# Patient Record
Sex: FEMALE | Race: OTHER | HISPANIC OR LATINO
[De-identification: names, ages, dates, MRNs, and addresses within clinical notes are randomized per-mention and may not be internally consistent; named-entity substitution may affect disease eponyms.]

---

## 2018-04-17 ENCOUNTER — RESULT REVIEW (OUTPATIENT)
Age: 68
End: 2018-04-17

## 2018-06-06 ENCOUNTER — RESULT REVIEW (OUTPATIENT)
Age: 68
End: 2018-06-06

## 2018-09-18 ENCOUNTER — RESULT REVIEW (OUTPATIENT)
Age: 68
End: 2018-09-18

## 2019-05-29 ENCOUNTER — RESULT REVIEW (OUTPATIENT)
Age: 69
End: 2019-05-29

## 2019-07-22 ENCOUNTER — APPOINTMENT (OUTPATIENT)
Dept: COLORECTAL SURGERY | Facility: CLINIC | Age: 69
End: 2019-07-22

## 2019-10-07 ENCOUNTER — APPOINTMENT (OUTPATIENT)
Dept: COLORECTAL SURGERY | Facility: CLINIC | Age: 69
End: 2019-10-07
Payer: MEDICARE

## 2019-10-07 VITALS
HEART RATE: 88 BPM | TEMPERATURE: 97.7 F | WEIGHT: 115 LBS | DIASTOLIC BLOOD PRESSURE: 83 MMHG | SYSTOLIC BLOOD PRESSURE: 131 MMHG

## 2019-10-07 DIAGNOSIS — Z82.5 FAMILY HISTORY OF ASTHMA AND OTHER CHRONIC LOWER RESPIRATORY DISEASES: ICD-10-CM

## 2019-10-07 DIAGNOSIS — Z12.11 ENCOUNTER FOR SCREENING FOR MALIGNANT NEOPLASM OF COLON: ICD-10-CM

## 2019-10-07 DIAGNOSIS — Z83.79 FAMILY HISTORY OF OTHER DISEASES OF THE DIGESTIVE SYSTEM: ICD-10-CM

## 2019-10-07 DIAGNOSIS — Z80.0 FAMILY HISTORY OF MALIGNANT NEOPLASM OF DIGESTIVE ORGANS: ICD-10-CM

## 2019-10-07 DIAGNOSIS — Z72.3 LACK OF PHYSICAL EXERCISE: ICD-10-CM

## 2019-10-07 DIAGNOSIS — E03.9 HYPOTHYROIDISM, UNSPECIFIED: ICD-10-CM

## 2019-10-07 PROCEDURE — 99213 OFFICE O/P EST LOW 20 MIN: CPT

## 2019-10-07 RX ORDER — LEVOTHYROXINE SODIUM 75 UG/1
75 TABLET ORAL
Refills: 0 | Status: ACTIVE | COMMUNITY

## 2019-10-10 NOTE — CONSULT LETTER
[Consult Letter:] : I had the pleasure of evaluating your patient, [unfilled]. [( Thank you for referring [unfilled] for consultation for _____ )] : Thank you for referring [unfilled] for consultation for [unfilled] [Dear  ___] : Dear  [unfilled], [Consult Closing:] : Thank you very much for allowing me to participate in the care of this patient.  If you have any questions, please do not hesitate to contact me. [Sincerely,] : Sincerely, [Please see my note below.] : Please see my note below. [FreeTextEntry2] : CINDY MCMILLAN\par [address] [place] [address],\par [place], [place] [FreeTextEntry3] : NICO ARCHULETA MD

## 2019-10-10 NOTE — ASSESSMENT
[FreeTextEntry1] : I have reviewed with the patient in detail the role of colonoscopy any evaluation of possible colon polyps and cancer. The risks, alternatives and benefits of the procedure were detailed and client including but not limited to risk of bleeding, risk of infection, risk of perforation and requiring further and potential surgery, and other secondary complications of the procedure. The colonoscopy will be performed to evaluate for possible polyps and cancer and if possible a polypectomy or removal of the polyp will be performed. Bowel preparation instructions were reviewed. The need for long-term surveillance based on findings of the colonoscopy were discussed.\par The patient consents to the planned procedure.\par

## 2019-10-10 NOTE — PHYSICAL EXAM
[No HSM] : no hepatosplenomegaly [Normal Heart Sounds] : normal heart sounds [No Rash or Lesion] : No rash or lesion [Alert] : alert [Abdomen Masses] : No abdominal masses [Abdomen Tenderness] : ~T No ~M abdominal tenderness [JVD] : no jugular venous distention

## 2019-10-10 NOTE — HISTORY OF PRESENT ILLNESS
[FreeTextEntry1] : 68 yo F presents for routine follow up and to schedule colonoscopy\par \par Last seen at [facility] in 2018 for concern of rectal bleeding, after experienced constipation from taking magnesium/calcium and grapefruit juice.\par Last colonoscopy completed 2014 w/ Dr. Starr, diverticulosis as per patient, otherwise normal. Due for colonoscopy\par Pt presents today w/o complaints. Denies fever, weight loss, rectal pain or bleeding\par \par BH: daily, occasional straining.\par Admits could improve dietary fiber intake. Takes glycerine suppository as needed for constipation.\par No use of fiber supplement or stool softener\par Mother w/ colon cancer, diagnosed age 80 s/p partial colectomy

## 2019-11-13 ENCOUNTER — APPOINTMENT (OUTPATIENT)
Dept: COLORECTAL SURGERY | Facility: CLINIC | Age: 69
End: 2019-11-13
Payer: MEDICARE

## 2019-11-13 PROCEDURE — G0105: CPT

## 2019-11-13 PROCEDURE — 45378 DIAGNOSTIC COLONOSCOPY: CPT

## 2020-06-17 ENCOUNTER — RESULT REVIEW (OUTPATIENT)
Age: 70
End: 2020-06-17

## 2020-07-13 ENCOUNTER — APPOINTMENT (OUTPATIENT)
Dept: GYNECOLOGIC ONCOLOGY | Facility: CLINIC | Age: 70
End: 2020-07-13
Payer: MEDICARE

## 2020-07-13 VITALS
BODY MASS INDEX: 24.35 KG/M2 | OXYGEN SATURATION: 100 % | HEIGHT: 58 IN | TEMPERATURE: 97.9 F | WEIGHT: 116 LBS | HEART RATE: 73 BPM | DIASTOLIC BLOOD PRESSURE: 73 MMHG | SYSTOLIC BLOOD PRESSURE: 119 MMHG

## 2020-07-13 DIAGNOSIS — Z86.39 PERSONAL HISTORY OF OTHER ENDOCRINE, NUTRITIONAL AND METABOLIC DISEASE: ICD-10-CM

## 2020-07-13 PROCEDURE — 99205 OFFICE O/P NEW HI 60 MIN: CPT

## 2020-09-06 DIAGNOSIS — Z01.818 ENCOUNTER FOR OTHER PREPROCEDURAL EXAMINATION: ICD-10-CM

## 2020-09-08 ENCOUNTER — APPOINTMENT (OUTPATIENT)
Dept: DISASTER EMERGENCY | Facility: CLINIC | Age: 70
End: 2020-09-08

## 2020-09-09 ENCOUNTER — OUTPATIENT (OUTPATIENT)
Dept: OUTPATIENT SERVICES | Facility: HOSPITAL | Age: 70
LOS: 1 days | End: 2020-09-09
Payer: COMMERCIAL

## 2020-09-09 VITALS
TEMPERATURE: 97 F | DIASTOLIC BLOOD PRESSURE: 67 MMHG | HEIGHT: 59 IN | RESPIRATION RATE: 18 BRPM | SYSTOLIC BLOOD PRESSURE: 142 MMHG | OXYGEN SATURATION: 99 % | WEIGHT: 117.95 LBS | HEART RATE: 90 BPM

## 2020-09-09 DIAGNOSIS — N87.9 DYSPLASIA OF CERVIX UTERI, UNSPECIFIED: ICD-10-CM

## 2020-09-09 DIAGNOSIS — Z98.890 OTHER SPECIFIED POSTPROCEDURAL STATES: Chronic | ICD-10-CM

## 2020-09-09 DIAGNOSIS — N17.9 ACUTE KIDNEY FAILURE, UNSPECIFIED: ICD-10-CM

## 2020-09-09 DIAGNOSIS — Z90.89 ACQUIRED ABSENCE OF OTHER ORGANS: Chronic | ICD-10-CM

## 2020-09-09 DIAGNOSIS — E03.9 HYPOTHYROIDISM, UNSPECIFIED: ICD-10-CM

## 2020-09-09 DIAGNOSIS — Z98.891 HISTORY OF UTERINE SCAR FROM PREVIOUS SURGERY: Chronic | ICD-10-CM

## 2020-09-09 LAB
BLD GP AB SCN SERPL QL: SIGNIFICANT CHANGE UP
SARS-COV-2 N GENE NPH QL NAA+PROBE: NOT DETECTED

## 2020-09-09 PROCEDURE — G0463: CPT

## 2020-09-09 RX ORDER — SODIUM CHLORIDE 9 MG/ML
3 INJECTION INTRAMUSCULAR; INTRAVENOUS; SUBCUTANEOUS EVERY 8 HOURS
Refills: 0 | Status: DISCONTINUED | OUTPATIENT
Start: 2020-09-11 | End: 2020-09-18

## 2020-09-09 NOTE — H&P PST ADULT - HISTORY OF PRESENT ILLNESS
70 years old female with PMH of Hypothyroidism, HDL presents to [facility] today for presurgical evaluation. On routine pap smear test, patient was diagnosed with dysplasia of the cervix and is now scheduled for examination under aesthesia with cone biopsy on 9/11/20.

## 2020-09-09 NOTE — H&P PST ADULT - NSICDXPROBLEM_GEN_ALL_CORE_FT
PROBLEM DIAGNOSES  Problem: Dysplasia of cervix uteri  Assessment and Plan: patient scheduled for examination under anesthesia with cone biopsy on 9/11/20. Preop instructions given.     Problem: Hypothyroidism  Assessment and Plan: Continue Synthroid as prescribed.

## 2020-09-09 NOTE — H&P PST ADULT - NSICDXPASTSURGICALHX_GEN_ALL_CORE_FT
PAST SURGICAL HISTORY:  H/O breast biopsy  right left breast    H/O colonoscopy     H/O dilation and curettage 1979    History of tonsillectomy 1958    S/P  section x 1

## 2020-09-09 NOTE — H&P PST ADULT - SKIN
LEFT STUMP'S DRESSING FELL OFF. COMPETED DRESSING CHANGE TO LEFT STUMO. PATIENT VE EUDUOC

-------------------------------------------------------------------------------

Addendum: 10/12/19 at 0704 by Karena Ji RN

-------------------------------------------------------------------------------

ENTER ERROR detailed exam warm and dry

## 2020-09-09 NOTE — H&P PST ADULT - NSANTHOSAYNRD_GEN_A_CORE
No. BEHZAD screening performed.  STOP BANG Legend: 0-2 = LOW Risk; 3-4 = INTERMEDIATE Risk; 5-8 = HIGH Risk

## 2020-09-09 NOTE — H&P PST ADULT - DENTITION
"  Physical Therapy Daily Treatment     Visit Count: 8  Plan of Care Dates: Initial: 9/21/17 Through: 12/14/17  Insurance Information:  [facility] [place]#[id number]  4 VISITS  10/23/17-12/7/17    AUTH OBTAINED  6 VISITS  9/21/17-11/5/17    THERAPY BENEFITS:  PAYOR: [employer] MEDICARE  VISIT LIMIT: MEDICAL NECESSITY  AUTHORIZATION NEEDED: AFTER EVAL  NOTES: FOLLOW MEDICARE GUIDLINES  COINSURANCE: 0%  COPAY: $40.00  REF #: ONLINE  Next Referring Provider Visit: TBD    Referred by: Sindi Ching MD  Medical Diagnosis (from order):   Strain of back, subsequent encounter [V58.89, 847.9] Â - Primary   Insurance: 1. [employer] MEDICARE  2. N/A    Date of onset/injury: 8/8/17 fall at work   Diagnosis Precautions: 10 lb lifting restriction   Chart reviewed: Relevant co-morbidities, allergies, tests and medications: [place], loop recorder implanted ""heart monitor\"" (BI-V implant per epic), taking a blood thinner and beta blocker, thyroid disease, HTN     Pt works part time at [employer]. Currently light duty since fall with 10# lifting restriction. Pt works 8 hour shifts of primarily standing tasks. SUBJECTIVE   Pt states she went to work after last session and pain was manageable   Current Pain: 4/10 lumbar   Functional Change: working on HEP stretching. Pt has been using heat at home to control pain. OBJECTIVE   Range of Motion (%)  Â  Initial 10/19/17.    Lumbar Flexion 60 tightness   Worse than extension  70%   Tightness/Stretching    Lumbar Extension 15 tingling and pulling  25%   Pain reproduction of central low back pain   Lumbar Lateral Flexion Left 20 stretching right  70%   Stiff and tight   Lumbar Lateral Flexion Right  12 right lumbar pain  65%  Stiff and tight    Lumbar Rotation Left  20% WFL - no pain   Lumbar Rotation Right  50% WFL - no pain   standard testing positions unless otherwise noted; Key: ranges are reported in active range of motion unless noted as AA=active assistive or P=passive range of motion, * denotes pain " Comments: Only those motions that were assessed are noted    R=right, L=left, B=bilateral, QS=quad set, GS=glut set, TKE=terminal knee extension, SLR=straight leg raise,LAQ = long arch quad, BKFO = bent knee fall out, SLS=single leg stance, MET= muscle energy technique, ITB=iliotibial band, TFL=tensor fascia kala, WB=weightbearing, WS=weight shift, EV=eversion, M/L=medial/lateral, PA=posterior anterior, SLS=single leg stance, LBP = low back pain, HEP = home exercise program, RAD = radicular, DC= discharge, LE = lower extremity, UE = upper extremity, in. = inch, # = pound, CW = clockwise, CCW = counter clockwise, DF = dorsiflexion, SPC = single point cane, OTC = over the counter medication, ww = wheeled walker, HTN = hypertension, LAQ = long arc quad set, TA = tibialis anterior, QL = quadratus lumborum, PCP = primary care physician, IR = internal rotation, ER= external rotation, SP = spinous process, TP = transverse process, LAD = long axis distraction, WBAT  =weight bearing as tolerated, SB = side bending, LLD = leg length difference, MWM =mobilization with movement, SL = side lying, LTR = lower trunk rotation, TLJ= thoracic lumbar junction, CTJ = cervical thoracic junction,  IASTM = instrument assisted soft tissue mobilization                        Treatment   Therapeutic Exercises:  Recumbent bike level 5 forward x 5 minutes   SL hip ABD 2 x 12 BLE   Squat with 15# box x 5 cues to not use arms for lifting       Manual Therapy:   Prone hip IR/ER BLE   Prone lumbar PA grade 3   SOURAV lumbar NAGs   Prone R innominate anterior, inferior, and posterior glides     Therapeutic Activity:  NA this date     Current Home Program (not performed this date except as noted above): Exercise: Date issued Date DC Comments   4 point rock, LTR, lifting mechanics eval     3 way hip level 2  10/12/17     Squat  10/31/17     Hip ABD SL 11/2/17             ASSESSMENT   After this week pt will be transitioning to 1x/week.  PT able to tolerate manual this date but upper lumbar was tender to PA. Fatigued easily with hip ABD R>L. Pt still able to work part time. Progress as able. Pain after treatment: 4/10 lumbar   Result of above outlined education: Verbalizes understanding, Demonstrates understanding and Needs reinforcement    Goals: To be obtained by end of this plan of care:  1. Patient independent with modified and progressed home exercise program. Ongoing and met to date. 10/19/17. 2. Patient will report decreased lumbar pain/symptoms to 0/10 to aid in age appropriate activities, sleeping undisturbed through a night, independent transfers and bed mobility, positional transitions and activity tolerance. At highest 7/10 with activity, on average 4/10 , 10/19/17. 3. Patient will increase lumbar active range of motion to within functional limits to aid in sitting/standing for 30 minutes to cook/eat a meal, returning to community activities, age appropriate activities, lifting as required, completing household tasks, independent transfers and bed mobility, positional transitions, completing self-care tasks/dressing and activity tolerance. 4. Patient will increase involved strength to 4/5 to aid in normalization of gait for independent living, ambulating 30 minutes for independent living, sitting/standing for 30 minutes to cook/eat a meal, returning to community activities, age appropriate activities and activity tolerance. Progressing well. 10/19/17.  5. Patient will demonstrate proper body mechanics for sitting and standing. Progressing well. 10/19/17. 6. Patient will be able to sleep 6 hours without disruption from pain. Progressing well 5-6 hours of sleep per night. 10/19/17.  7. Patient/Client will be able to lift 25 pounds from floor to waist with proper body mechanics to assist with lifting activities at work. Increased pain persists. 10/19/17.   Oswestry: Patient will complete form to reflect an improved score from initial score of 40 to less than or equal to 20% (0-20% = minimal disability; 20-40% = moderate disability; 40-60% = severe disability; 60-80% = crippled; % = bed bound) to indicate pt reported improvement in function/disability/impairment (minimal detectable change: 12%). Goal status: ongoing, added outcome goal 9/26/17     PLAN   Bike level 5 for warm up  Foam roller horizontal   Hip and core strengthening  -squat form with lifting review, uneven surfaces, cybex    lumbar stabilization program    No IFC due to heart implant     THERAPY DAILY BILLING   Primary Insurance:  HU[employer]NA MEDICARE  Secondary Insurance: N/A    Evaluation Procedures:  No evaluation codes were used on this date of service    Timed Procedures:  Manual Therapy, 20 minutes  Therapeutic Exercise, 10 minutes    Untimed Procedures:  No untimed codes were used on this date of service     Total Treatment Time: 30 minutes  Patient was 15 minutes late for appointment due to parking     G-Code:  G-Code Score ABN form  reporting not required this treatment session  Modifier based on outcome measure(s)/functional testing/clinical judgement as listed above    The referring provider's electronic or written signature on the evaluation authorizes the therapy plan of care and certifies the need for these services, furnished under this plan of care while under their care. Physician Signature on file. Patient agrees with above POC. normal

## 2020-09-10 ENCOUNTER — TRANSCRIPTION ENCOUNTER (OUTPATIENT)
Age: 70
End: 2020-09-10

## 2020-09-10 DIAGNOSIS — N87.9 DYSPLASIA OF CERVIX UTERI, UNSPECIFIED: ICD-10-CM

## 2020-09-11 ENCOUNTER — RESULT REVIEW (OUTPATIENT)
Age: 70
End: 2020-09-11

## 2020-09-11 ENCOUNTER — APPOINTMENT (OUTPATIENT)
Dept: GYNECOLOGIC ONCOLOGY | Facility: HOSPITAL | Age: 70
End: 2020-09-11

## 2020-09-11 ENCOUNTER — OUTPATIENT (OUTPATIENT)
Dept: OUTPATIENT SERVICES | Facility: HOSPITAL | Age: 70
LOS: 1 days | End: 2020-09-11
Payer: COMMERCIAL

## 2020-09-11 VITALS
HEART RATE: 69 BPM | SYSTOLIC BLOOD PRESSURE: 150 MMHG | OXYGEN SATURATION: 100 % | DIASTOLIC BLOOD PRESSURE: 53 MMHG | TEMPERATURE: 98 F | RESPIRATION RATE: 14 BRPM

## 2020-09-11 VITALS
HEIGHT: 59 IN | TEMPERATURE: 98 F | WEIGHT: 117.95 LBS | SYSTOLIC BLOOD PRESSURE: 131 MMHG | DIASTOLIC BLOOD PRESSURE: 69 MMHG | HEART RATE: 83 BPM | RESPIRATION RATE: 18 BRPM

## 2020-09-11 DIAGNOSIS — Z98.890 OTHER SPECIFIED POSTPROCEDURAL STATES: Chronic | ICD-10-CM

## 2020-09-11 DIAGNOSIS — N17.9 ACUTE KIDNEY FAILURE, UNSPECIFIED: ICD-10-CM

## 2020-09-11 DIAGNOSIS — Z90.89 ACQUIRED ABSENCE OF OTHER ORGANS: Chronic | ICD-10-CM

## 2020-09-11 DIAGNOSIS — N87.9 DYSPLASIA OF CERVIX UTERI, UNSPECIFIED: ICD-10-CM

## 2020-09-11 DIAGNOSIS — Z98.891 HISTORY OF UTERINE SCAR FROM PREVIOUS SURGERY: Chronic | ICD-10-CM

## 2020-09-11 LAB — BLD GP AB SCN SERPL QL: SIGNIFICANT CHANGE UP

## 2020-09-11 PROCEDURE — 57520 CONIZATION OF CERVIX: CPT

## 2020-09-11 PROCEDURE — 88307 TISSUE EXAM BY PATHOLOGIST: CPT | Mod: 26

## 2020-09-11 PROCEDURE — 36415 COLL VENOUS BLD VENIPUNCTURE: CPT

## 2020-09-11 PROCEDURE — 88305 TISSUE EXAM BY PATHOLOGIST: CPT

## 2020-09-11 PROCEDURE — 88305 TISSUE EXAM BY PATHOLOGIST: CPT | Mod: 26

## 2020-09-11 PROCEDURE — 86901 BLOOD TYPING SEROLOGIC RH(D): CPT

## 2020-09-11 PROCEDURE — 86850 RBC ANTIBODY SCREEN: CPT

## 2020-09-11 PROCEDURE — C1889: CPT

## 2020-09-11 PROCEDURE — 86900 BLOOD TYPING SEROLOGIC ABO: CPT

## 2020-09-11 PROCEDURE — 88307 TISSUE EXAM BY PATHOLOGIST: CPT

## 2020-09-11 RX ORDER — ONDANSETRON 8 MG/1
4 TABLET, FILM COATED ORAL ONCE
Refills: 0 | Status: DISCONTINUED | OUTPATIENT
Start: 2020-09-11 | End: 2020-09-11

## 2020-09-11 RX ORDER — ACETAMINOPHEN 500 MG
3 TABLET ORAL
Qty: 30 | Refills: 0
Start: 2020-09-11

## 2020-09-11 RX ORDER — IBUPROFEN 200 MG
600 TABLET ORAL EVERY 6 HOURS
Refills: 0 | Status: DISCONTINUED | OUTPATIENT
Start: 2020-09-11 | End: 2020-09-18

## 2020-09-11 RX ORDER — IBUPROFEN 200 MG
1 TABLET ORAL
Qty: 30 | Refills: 0
Start: 2020-09-11

## 2020-09-11 RX ORDER — ACETAMINOPHEN 500 MG
975 TABLET ORAL EVERY 6 HOURS
Refills: 0 | Status: DISCONTINUED | OUTPATIENT
Start: 2020-09-11 | End: 2020-09-18

## 2020-09-11 RX ORDER — FENTANYL CITRATE 50 UG/ML
50 INJECTION INTRAVENOUS
Refills: 0 | Status: DISCONTINUED | OUTPATIENT
Start: 2020-09-11 | End: 2020-09-11

## 2020-09-11 RX ORDER — ACETAMINOPHEN 500 MG
1000 TABLET ORAL ONCE
Refills: 0 | Status: DISCONTINUED | OUTPATIENT
Start: 2020-09-11 | End: 2020-09-11

## 2020-09-11 RX ORDER — SODIUM CHLORIDE 9 MG/ML
1000 INJECTION, SOLUTION INTRAVENOUS
Refills: 0 | Status: DISCONTINUED | OUTPATIENT
Start: 2020-09-11 | End: 2020-09-18

## 2020-09-11 RX ORDER — FENTANYL CITRATE 50 UG/ML
25 INJECTION INTRAVENOUS
Refills: 0 | Status: DISCONTINUED | OUTPATIENT
Start: 2020-09-11 | End: 2020-09-11

## 2020-09-11 NOTE — ASU DISCHARGE PLAN (ADULT/PEDIATRIC) - CALL YOUR DOCTOR IF YOU HAVE ANY OF THE FOLLOWING:
Nausea and vomiting that does not stop/Increased irritability or sluggishness/Unable to urinate/Excessive diarrhea/Swelling that gets worse/Bleeding that does not stop/Fever greater than (need to indicate Fahrenheit or Celsius)/Wound/Surgical Site with redness, or foul smelling discharge or pus/Inability to tolerate liquids or foods/Pain not relieved by Medications/Numbness, tingling, color or temperature change to extremity

## 2020-09-11 NOTE — ASU DISCHARGE PLAN (ADULT/PEDIATRIC) - CARE PROVIDER_API CALL
Leann Delatorre)  Gynecologic Oncology; Obstetrics and Gynecology  [address] [place] [address]  Phone: [phone]  Fax: [phone]  Follow Up Time:

## 2020-09-11 NOTE — BRIEF OPERATIVE NOTE - OPERATION/FINDINGS
No gross lesions on cervix on inspection. No unstained areas noted on application of Lugol solution. Cold knife cone biopsy performed to depth of 2cm and 2-3mm borders. Minimal cervical tissue left behind. Surgicel applied over cone bed. No gross lesions on cervix on inspection. No unstained areas noted on application of Lugol solution. Cold knife cone biopsy performed to depth of 1 cm and 2 mm borders. Minimal cervical tissue left behind. Surgicel applied over cone bed.

## 2020-09-11 NOTE — ASU DISCHARGE PLAN (ADULT/PEDIATRIC) - ASU DC SPECIAL INSTRUCTIONSFT
Regular diet as tolerated, regular activity as tolerated, no heavy lifting for first two weeks.  Nothing per vagina: no intercourse, tampons or douching.  Call your provider if you experience fevers, chills, worsening abdominal pain, inability to urinate or worsening vaginal bleeding. You have a hemostatic agent placed at the cervix bed which looks like gauze. This may fall out on or will dissolve away on its own. It does not require any maintenance by you. Please follow up with your provider in 2 weeks.

## 2020-09-11 NOTE — BRIEF OPERATIVE NOTE - NSICDXBRIEFPOSTOP_GEN_ALL_CORE_FT
POST-OP DIAGNOSIS:  High grade squamous intraepithelial lesion (HGSIL), grade 3 LINDY, on biopsy of cervix 11-Sep-2020 10:33:12  Libby Alexandre

## 2020-09-11 NOTE — BRIEF OPERATIVE NOTE - NSICDXBRIEFPREOP_GEN_ALL_CORE_FT
PRE-OP DIAGNOSIS:  High grade squamous intraepithelial lesion (HGSIL), grade 3 LINDY, on biopsy of cervix 11-Sep-2020 10:33:04  Libby Alexandre

## 2020-09-27 PROBLEM — Z48.89 POSTOPERATIVE VISIT: Status: ACTIVE | Noted: 2020-09-27

## 2020-09-28 ENCOUNTER — APPOINTMENT (OUTPATIENT)
Dept: GYNECOLOGIC ONCOLOGY | Facility: CLINIC | Age: 70
End: 2020-09-28
Payer: MEDICARE

## 2020-09-28 VITALS
BODY MASS INDEX: 24.56 KG/M2 | OXYGEN SATURATION: 97 % | HEART RATE: 95 BPM | SYSTOLIC BLOOD PRESSURE: 135 MMHG | DIASTOLIC BLOOD PRESSURE: 70 MMHG | HEIGHT: 58 IN | TEMPERATURE: 98.4 F | WEIGHT: 117 LBS

## 2020-09-28 DIAGNOSIS — Z48.89 ENCOUNTER FOR OTHER SPECIFIED SURGICAL AFTERCARE: ICD-10-CM

## 2020-09-28 PROBLEM — E78.00 PURE HYPERCHOLESTEROLEMIA, UNSPECIFIED: Chronic | Status: ACTIVE | Noted: 2020-09-09

## 2020-09-28 PROBLEM — E03.9 HYPOTHYROIDISM, UNSPECIFIED: Chronic | Status: ACTIVE | Noted: 2020-09-09

## 2020-09-28 PROCEDURE — 99024 POSTOP FOLLOW-UP VISIT: CPT

## 2020-12-21 PROBLEM — Z12.11 ENCOUNTER FOR SCREENING COLONOSCOPY: Status: RESOLVED | Noted: 2019-10-07 | Resolved: 2020-12-21

## 2021-08-11 ENCOUNTER — APPOINTMENT (OUTPATIENT)
Dept: GYNECOLOGIC ONCOLOGY | Facility: CLINIC | Age: 71
End: 2021-08-11
Payer: MEDICARE

## 2021-08-13 ENCOUNTER — TRANSCRIPTION ENCOUNTER (OUTPATIENT)
Age: 71
End: 2021-08-13

## 2021-08-18 ENCOUNTER — APPOINTMENT (OUTPATIENT)
Dept: GYNECOLOGIC ONCOLOGY | Facility: CLINIC | Age: 71
End: 2021-08-18
Payer: MEDICARE

## 2021-08-18 VITALS
HEART RATE: 91 BPM | WEIGHT: 122 LBS | HEIGHT: 58 IN | BODY MASS INDEX: 25.61 KG/M2 | SYSTOLIC BLOOD PRESSURE: 132 MMHG | DIASTOLIC BLOOD PRESSURE: 69 MMHG

## 2021-08-18 DIAGNOSIS — Z09 ENCOUNTER FOR FOLLOW-UP EXAMINATION AFTER COMPLETED TREATMENT FOR CONDITIONS OTHER THAN MALIGNANT NEOPLASM: ICD-10-CM

## 2021-08-18 PROCEDURE — 99213 OFFICE O/P EST LOW 20 MIN: CPT

## 2021-08-21 PROBLEM — Z09 FOLLOW UP: Status: ACTIVE | Noted: 2021-08-08

## 2021-09-08 LAB
CYTOLOGY CVX/VAG DOC THIN PREP: NORMAL
HPV 16 E6+E7 MRNA CVX QL NAA+PROBE: NOT DETECTED
HPV HIGH+LOW RISK DNA PNL CVX: NOT DETECTED
HPV18+45 E6+E7 MRNA CVX QL NAA+PROBE: NOT DETECTED

## 2021-11-15 ENCOUNTER — NON-APPOINTMENT (OUTPATIENT)
Age: 71
End: 2021-11-15

## 2022-02-03 ENCOUNTER — NON-APPOINTMENT (OUTPATIENT)
Age: 72
End: 2022-02-03

## 2022-02-07 ENCOUNTER — NON-APPOINTMENT (OUTPATIENT)
Age: 72
End: 2022-02-07

## 2022-02-07 ENCOUNTER — APPOINTMENT (OUTPATIENT)
Dept: OBGYN | Facility: CLINIC | Age: 72
End: 2022-02-07
Payer: MEDICARE

## 2022-02-07 VITALS
BODY MASS INDEX: 26.66 KG/M2 | TEMPERATURE: 97.2 F | HEIGHT: 58 IN | WEIGHT: 127 LBS | HEART RATE: 93 BPM | DIASTOLIC BLOOD PRESSURE: 83 MMHG | OXYGEN SATURATION: 98 % | SYSTOLIC BLOOD PRESSURE: 142 MMHG

## 2022-02-07 DIAGNOSIS — Z01.419 ENCOUNTER FOR GYNECOLOGICAL EXAMINATION (GENERAL) (ROUTINE) W/OUT ABNORMAL FINDINGS: ICD-10-CM

## 2022-02-07 PROCEDURE — 99387 INIT PM E/M NEW PAT 65+ YRS: CPT | Mod: GY

## 2022-02-07 PROCEDURE — G0101: CPT

## 2022-02-07 NOTE — DISCUSSION/SUMMARY
[FreeTextEntry1] : [] pap/hpv in 6months\par [] mammo referral\par [] DEXA and colonoscopy UTD\par RTC in 6months\par Genny BARDALES

## 2022-02-07 NOTE — HISTORY OF PRESENT ILLNESS
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 repeat pap 8/2021 neg. \par mammo-5/2021, neg\par colonoscopy- 11/2019, neg. repeat 5yrs\par DEXA- 5/2021, osteopenia. ca/vitD. \par \par lives alone.\par retired teacher.

## 2022-05-13 ENCOUNTER — NON-APPOINTMENT (OUTPATIENT)
Age: 72
End: 2022-05-13

## 2022-05-13 ENCOUNTER — APPOINTMENT (OUTPATIENT)
Dept: OPHTHALMOLOGY | Facility: CLINIC | Age: 72
End: 2022-05-13
Payer: MEDICARE

## 2022-05-13 PROCEDURE — 92250 FUNDUS PHOTOGRAPHY W/I&R: CPT

## 2022-05-13 PROCEDURE — 92025 CPTRIZED CORNEAL TOPOGRAPHY: CPT

## 2022-05-13 PROCEDURE — 92136 OPHTHALMIC BIOMETRY: CPT

## 2022-05-13 PROCEDURE — 92002 INTRM OPH EXAM NEW PATIENT: CPT

## 2022-07-27 ENCOUNTER — APPOINTMENT (OUTPATIENT)
Dept: OBGYN | Facility: CLINIC | Age: 72
End: 2022-07-27

## 2022-07-27 VITALS
HEIGHT: 58 IN | BODY MASS INDEX: 26.24 KG/M2 | DIASTOLIC BLOOD PRESSURE: 68 MMHG | HEART RATE: 88 BPM | TEMPERATURE: 97 F | SYSTOLIC BLOOD PRESSURE: 104 MMHG | RESPIRATION RATE: 12 BRPM | OXYGEN SATURATION: 97 % | WEIGHT: 125 LBS

## 2022-07-27 PROCEDURE — 99213 OFFICE O/P EST LOW 20 MIN: CPT

## 2022-07-28 NOTE — PHYSICAL EXAM
[Labia Majora] : normal [Labia Minora] : normal [Cervical Stenosis] : cervical stenosis [Normal] : normal [Uterine Adnexae] : normal

## 2022-07-28 NOTE — HISTORY OF PRESENT ILLNESS
Pt. Discharged to home with . AVS reviewed with pt. Questions and concerns answered. Pt education regarding CKD given verbally and with written materials. Follow-up appointments made with outpatient PCP.     IV in left AC and tele box removed.   [FreeTextEntry1] : pt here for pap for history of LINDY 3.\par s/p LEEP w/ positive margines (9/2020) then cone (9/2020). neg pap 8/2021.

## 2022-08-04 LAB
CYTOLOGY CVX/VAG DOC THIN PREP: NORMAL
HPV HIGH+LOW RISK DNA PNL CVX: NOT DETECTED

## 2022-08-25 RX ORDER — ACETAMINOPHEN 500 MG
650 TABLET ORAL EVERY 6 HOURS
Refills: 0 | Status: DISCONTINUED | OUTPATIENT
Start: 2022-08-30 | End: 2022-08-30

## 2022-08-25 RX ORDER — SODIUM CHLORIDE 9 MG/ML
1000 INJECTION, SOLUTION INTRAVENOUS
Refills: 0 | Status: DISCONTINUED | OUTPATIENT
Start: 2022-08-30 | End: 2022-08-30

## 2022-08-25 NOTE — OPERATIVE REPORT - OPERATIVE RPOSRT DETAILS
PATIENT: EVENS VALE	    ACCOUNT  NUMBER:  [id number]     OPERATING SURGEON:  Dr. Monroe Love	    ASSISTANT SURGEON:  [  Karli Flores MD  ]    DATE OF SURGERY:  [ 8/30/22 ]	    ANESTHESIA:  MAC/TOPICAL	    ESTIMATED BLOOD LOSS: < 1mL	    COMPLICATIONS:  [   None  ]		    PREOPERATIVE DIAGNOSIS:  Cataract,  [   right   ] eye    POSTOPERATIVE DIAGNOSIS:  Cataract, [ right   ] eye    OPERATIVE PROCEDURE:  1. Femtosecond laser assisted laser surgery  [  right ] eye.  2. Phacoemulsification with insertion of posterior chamber intraocular lens [  right   ] eye    LENS IMPLANT: [ [id number] +17.0 D    ]    PROCEDURE:	The patient was brought to the laser room where certain aspects of the procedure were performed with the femtosecond laser.     The patient was then brought into the operating room and placed supine on the operating room table. After uneventful induction of neuroleptic anesthesia,  tetracaine was instilled into the operative eye achieving sufficient anesthesia.  The patient was then prepped and draped in the usual sterile fashion.  A wire lid speculum was then inserted into the operative eye giving a wide palpebral fissure.      	A josie knife was used to perform paracenteses through clear cornea at the 12 and 6 o’clock limbal positions.  The anterior chamber was irrigated with lidocaine 1% nonpreserved followed by epinephrine 0.1% nonpreserved.  Viscoelastic was then used to maintain the anterior chamber.  A keratome was used to create a clear corneal incision just inside the temporal limbus.  Capsulorhexis forceps were used to complete the capsulorhexis. Balanced salt solution was used to hydrodissect the nucleus.  The [employer] phacoemulsification unit was then used to completely phacoemulsify the nucleus, following which an aspirating handpiece was used to aspirate all cortical remnants from the capsular bag.  Viscoelastic was  used to reform the anterior chamber and capsular bag.      	A new foldable posterior chamber intraocular lens was brought into the surgical field.  It was folded and directly injected into the capsular bag following which it was centered and secure.  All viscoelastic was then aspirated from the anterior segment using an aspirating handpiece.  All wounds were checked for leaks and there were none.  Vigamox was instilled into the eye. The lid speculum was removed from the eye and a shield placed over the eye.      	The patient tolerated the procedure well and went to the recovery area in good condition.       PATIENT: EVENS VALE	    ACCOUNT  NUMBER:  [id number]     OPERATING SURGEON:  Dr. Monroe Love	    ASSISTANT SURGEON:  [  Karli Flores MD  ]    DATE OF SURGERY:  [ 8/30/22 ]	    ANESTHESIA:  MAC/TOPICAL	    ESTIMATED BLOOD LOSS: < 1mL	    COMPLICATIONS:  [   None  ]		    PREOPERATIVE DIAGNOSIS:  Cataract,  [   right   ] eye    POSTOPERATIVE DIAGNOSIS:  Cataract, [ right   ] eye    OPERATIVE PROCEDURE:  1. Femtosecond laser assisted laser surgery  [  right ] eye .  2. Phacoemulsification with insertion of posterior chamber intraocular lens [  right   ] eye with ORA .       LENS IMPLANT: [ SN6AT4 +16.5 D    ]    PROCEDURE:	The patient was brought to the laser room where certain aspects of the procedure were performed with the femtosecond laser.     The patient was then brought into the operating room and placed supine on the operating room table. After uneventful induction of neuroleptic anesthesia, tetracaine was instilled into the operative eye achieving sufficient anesthesia.  The patient was then prepped and draped in the usual sterile fashion.  A wire lid speculum was then inserted into the operative eye giving a wide palpebral fissure.      	A josie knife was used to perform paracenteses through clear cornea at the 12 and 6 o’clock limbal positions.  The anterior chamber was irrigated with lidocaine 1% nonpreserved followed by epinephrine 0.1% nonpreserved.  Viscoelastic was then used to maintain the anterior chamber.  A keratome was used to create a clear corneal incision just inside the temporal limbus.  Capsulorhexis forceps were used to complete the capsulorhexis. Balanced salt solution was used to hydrodissect the nucleus.  The [employer] phacoemulsification unit was then used to completely phacoemulsify the nucleus, following which an aspirating handpiece was used to aspirate all cortical remnants from the capsular bag.  Viscoelastic was  used to reform the anterior chamber and capsular bag.  The ORA was used to confirm lens selection and orientation.     	A new foldable posterior chamber intraocular lens was brought into the surgical field.  It was folded and directly injected into the capsular bag following which it was centered and secure.  All viscoelastic was then aspirated from the anterior segment using an aspirating handpiece.  All wounds were checked for leaks and there were none.  Vigamox was instilled into the eye. The lid speculum was removed from the eye and a shield placed over the eye.      	The patient tolerated the procedure well and went to the recovery area in good condition.       PATIENT: EVENS VALE	    ACCOUNT  NUMBER:  [id number]     OPERATING SURGEON:  Dr. Monroe Love	    ASSISTANT SURGEON:  [  Karli Flores MD  ]    DATE OF SURGERY:  [ 8/30/22 ]	    ANESTHESIA:  MAC/TOPICAL	    ESTIMATED BLOOD LOSS: < 1mL	    COMPLICATIONS:  [   None  ]		    PREOPERATIVE DIAGNOSIS:  Cataract,  [   right   ] eye.    POSTOPERATIVE DIAGNOSIS:  Cataract, [ right   ] eye.    OPERATIVE PROCEDURE:  1. Femtosecond laser assisted laser surgery  [  right ] eye .  2. Phacoemulsification with insertion of posterior chamber intraocular lens [  right   ] eye with ORA .       LENS IMPLANT: [ SN6AT4 +16.5 D    ]    PROCEDURE:	The patient was brought to the laser room where certain aspects of the procedure were performed with the femtosecond laser.     The patient was then brought into the operating room and placed supine on the operating room table. After uneventful induction of neuroleptic anesthesia, tetracaine was instilled into the operative eye achieving sufficient anesthesia.  The patient was then prepped and draped in the usual sterile fashion.  A wire lid speculum was then inserted into the operative eye giving a wide palpebral fissure.      	A josie knife was used to perform paracenteses through clear cornea at the 12 and 6 o’clock limbal positions.  The anterior chamber was irrigated with lidocaine 1% nonpreserved followed by epinephrine 0.1% nonpreserved.  Viscoelastic was then used to maintain the anterior chamber.  A keratome was used to create a clear corneal incision just inside the temporal limbus.  Capsulorhexis forceps were used to complete the capsulorhexis. Balanced salt solution was used to hydrodissect the nucleus.  The [employer] phacoemulsification unit was then used to completely phacoemulsify the nucleus, following which an aspirating handpiece was used to aspirate all cortical remnants from the capsular bag.  Viscoelastic was  used to reform the anterior chamber and capsular bag.  The ORA was used to confirm lens selection.     	A new foldable posterior chamber intraocular lens was brought into the surgical field.  It was folded and directly injected into the capsular bag. The ORA was used to determine correct orientation of the lens.  All viscoelastic was then aspirated from the anterior segment using an aspirating handpiece.  All wounds were checked for leaks and there were none.  Vigamox was instilled into the eye. The lid speculum was removed from the eye and a shield placed over the eye.      	The patient tolerated the procedure well and went to the recovery area in good condition.

## 2022-08-26 NOTE — ASU PATIENT PROFILE, ADULT - FALL HARM RISK - UNIVERSAL INTERVENTIONS
Bed in lowest position, wheels locked, appropriate side rails in place/Call bell, personal items and telephone in reach/Instruct patient to call for assistance before getting out of bed or chair/Non-slip footwear when patient is out of bed/[place] to call system/Physically safe environment - no spills, clutter or unnecessary equipment/Purposeful Proactive Rounding/Room/bathroom lighting operational, light cord in reach

## 2022-08-26 NOTE — ASU PATIENT PROFILE, ADULT - CAREGIVER
"5/17/2018    Flori Reyes, DO  [address]  [place] [address]    RE: Gibson Villalta       Dear Colleague,    I had the pleasure of seeing Gibson Villalta in the [facility].    [id number]    Electrophysiology/ Clinic Note         H&P and Plan:         Josias Hernandez MD    Physical Exam:  Vitals: /63  Pulse 91  Ht 1.854 m (6' 1\")  Wt 105.2 kg (232 lb)  BMI 30.61 kg/m2    Constitutional:  AAO x3.  Pt is in NAD.  HEAD: normocephalic.  SKIN: Skin normal color, texture and turgor with no lesions or eruptions.  Eyes: PERRL, EOMI.  ENT:  Supple, normal JVP. No lymphadenopathy or thyroid enlargement.  Chest:  CTAB.  Cardiac:    RRR, normal  S1 and S2.  No murmurs rubs or gallop.   Abdomen:  Normal BS.  Soft, non-tender and non-distended.  No rebound or guarding.    Extremities:  Pedious pulses palpable B/L.  No LE edema noticed.   Neurological: Strength and sensation grossly symmetric and intact throughout.       CURRENT MEDICATIONS:  Current Outpatient Prescriptions   Medication Sig Dispense Refill     ACE/ARB NOT PRESCRIBED, INTENTIONAL, ACE & ARB not prescribed due to Symptomatic hypotension not due to excessive diuresis       ASPIRIN NOT PRESCRIBED (INTENTIONAL) Antiplatelet medication not prescribed intentionally due to Current anticoagulant therapy (warfarin/enoxaparin) 0 each 0     atorvastatin (LIPITOR) 40 MG tablet Take 1 tablet (40 mg) by mouth At Bedtime 90 tablet 1     COENZYME Q-10 PO Take by mouth daily       ferrous sulfate (IRON) 325 (65 FE) MG tablet Take 325 mg by mouth daily       furosemide (LASIX) 20 MG tablet Take 0.5 tablets (10 mg) by mouth daily 45 tablet 1     hydrALAZINE (APRESOLINE) 10 MG tablet Take 1 tablet (10 mg) by mouth 3 times daily 270 tablet 1     isosorbide mononitrate (IMDUR) 30 MG 24 hr tablet Take 0.5 tablets (15 mg) by mouth daily 45 tablet 1     metoprolol succinate (TOPROL-XL) 50 MG 24 hr tablet Take 1 tablet (50 mg) by " mouth daily 90 tablet 1     multivitamin, therapeutic with minerals (MULTI-VITAMIN) TABS Take 1 tablet by mouth daily       venlafaxine (EFFEXOR) 75 MG tablet Take 1 tablet (75 mg) by mouth 2 times daily 180 tablet 1     venlafaxine (EFFEXOR) 75 MG tablet Take 1 tablet (75 mg) by mouth 2 times daily 60 tablet 0     vitamin D (ERGOCALCIFEROL) 32191 UNIT capsule Take 1 capsule (50,000 Units) by mouth once a week 12 capsule 1     warfarin (COUMADIN) 2 MG tablet TAKE 2-3 TABLETS BY MOUTH DAILY AS DIRECTED BY INR CLINIC 270 tablet 0     albuterol (PROAIR HFA, PROVENTIL HFA, VENTOLIN HFA) 108 (90 BASE) MCG/ACT inhaler Inhale 2 puffs into the lungs every 6 hours as needed for shortness of breath / dyspnea or wheezing         ALLERGIES     Allergies   Allergen Reactions     Wasps [Hornets]      Sand wasp --- years ago.         PAST MEDICAL HISTORY:  Past Medical History:   Diagnosis Date     Acute kidney injury (H) 2012     Anemia      Anxiety      BPH (benign prostatic hypertrophy)      Carpal tunnel syndrome     Right     Chronic kidney disease (CKD), stage 3 (moderate)      Dementia      History of depression      LBBB (left bundle branch block) 2013     Lumbar herniated disc     L5-S1     Mixed cardiomyopathy 7/22/2016     Myocardial infarction 1995 and 2010     Nonischemic cardiomyopathy (H) 7/22/2016     Obesity      Rosacea      Rotator cuff disorder     Tear x 2     RV (right ventricular) mural thrombus 7/22/2016       PAST SURGICAL HISTORY:  Past Surgical History:   Procedure Laterality Date     ANGIOPLASTY  1995 and 2010 with stent     GASTRIC BYPASS  2001     HEART CATH LEFT HEART CATH  7/19/16    Non ischemic cardiomyopathy; Non functionally significant LAD and RCA disease      OTHER SURGICAL HISTORY  2006    Spinal surgery     OTHER SURGICAL HISTORY  Nov 2016    CRT-D implantation       FAMILY HISTORY:  Family History   Problem Relation Age of Onset     Coronary Artery Disease Father 39     Alzheimer Disease  Mother      Coronary Artery Disease Son      Two sons have  from MIs (ages 39 and 51)       SOCIAL HISTORY:  Social History     Social History     Marital status:      Spouse name: N/A     Number of children: N/A     Years of education: N/A     Occupational History     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 No     Special Diet Yes     low fat, low salt      Exercise No     Social History Narrative       Review of Systems:  Skin:  Negative     Eyes:  Positive for glasses  ENT:  Negative    Respiratory:  Negative    Cardiovascular:  Negative    Gastroenterology: Negative    Genitourinary:  Negative    Musculoskeletal:  Negative    Neurologic:  Negative    Psychiatric:  Negative    Heme/Lymph/Imm:  Negative    Endocrine:  Negative        Recent Lab Results:  LIPID RESULTS:  Lab Results   Component Value Date    CHOL 176 2017    HDL 42 2017    LDL 93 2017    TRIG 203 (H) 2017       LIVER ENZYME RESULTS:  Lab Results   Component Value Date    AST 32 2017    ALT 51 2017       CBC RESULTS:  Lab Results   Component Value Date    WBC 7.7 2017    RBC 4.61 2017    HGB 15.1 2017    HCT 44.9 2017    MCV 97 2017    MCH 32.8 2017    MCHC 33.6 2017    RDW 13.0 2017     2017       BMP RESULTS:  Lab Results   Component Value Date     2017    POTASSIUM 4.0 2017    CHLORIDE 107 2017    CO2 23 2017     ANIONGAP 9 08/25/2017     (H) 08/25/2017    BUN 18 08/25/2017    CR 1.34 (H) 08/25/2017    GFRESTIMATED 52 (L) 08/25/2017    GFRESTBLACK 63 08/25/2017    SHELLEY 8.9 08/25/2017        A1C RESULTS:  Lab Results   Component Value Date    A1C 6.5 (H) 08/25/2017       INR RESULTS:  Lab Results   Component Value Date    INR 3.6 (A) 05/08/2018    INR 3.8 (A) 04/24/2018    INR 2.70 (H) 11/05/2016    INR 2.03 (H) 11/04/2016         ECHOCARDIOGRAM  No results found for this or any previous visit (from the past 8760 hour(s)).      Orders Placed This Encounter   Procedures     Echocardiogram Limited     No orders of the defined types were placed in this encounter.    Medications Discontinued During This Encounter   Medication Reason     Omega-3 Fatty Acids (OMEGA-3 FISH OIL PO) Stopped by Patient         Encounter Diagnoses   Name Primary?     Chronic systolic heart failure (H)      Cardiomyopathy (H)          CC  Josias Hernandez MD  [address]  [place] [address]                Thank you for allowing me to participate in the care of your patient.      Sincerely,     Josias Hernandez MD     [facility]    cc:   Josias Hernandez MD  [address]  [place] [address]         Yes

## 2022-08-26 NOTE — ASU PATIENT PROFILE, ADULT - NS PREOP UNDERSTANDS INFO
Voice message, NO food any kind after 12Mn. no drinking, smoking, no  Jewelry, bring ID photo vaccination and insurance cards, ,Procedure of surgery is at 09:30  am , patient to be at hospital at 07:30 am. , address and telephone given to patient/no Spoke with patient, NPO after 12mn, ware comfortable clothes, no jewelry, Bring insurance and vaccination cards, escort arranged, telephone and address given to patient./yes

## 2022-08-30 ENCOUNTER — OUTPATIENT (OUTPATIENT)
Dept: OUTPATIENT SERVICES | Facility: HOSPITAL | Age: 72
LOS: 1 days | Discharge: HOME | End: 2022-08-30

## 2022-08-30 ENCOUNTER — APPOINTMENT (OUTPATIENT)
Dept: OPHTHALMOLOGY | Facility: AMBULATORY SURGERY CENTER | Age: 72
End: 2022-08-30

## 2022-08-30 ENCOUNTER — TRANSCRIPTION ENCOUNTER (OUTPATIENT)
Age: 72
End: 2022-08-30

## 2022-08-30 ENCOUNTER — NON-APPOINTMENT (OUTPATIENT)
Age: 72
End: 2022-08-30

## 2022-08-30 VITALS
DIASTOLIC BLOOD PRESSURE: 68 MMHG | RESPIRATION RATE: 16 BRPM | OXYGEN SATURATION: 99 % | HEART RATE: 77 BPM | TEMPERATURE: 98 F | SYSTOLIC BLOOD PRESSURE: 176 MMHG

## 2022-08-30 VITALS
HEIGHT: 59 IN | RESPIRATION RATE: 16 BRPM | TEMPERATURE: 98 F | SYSTOLIC BLOOD PRESSURE: 129 MMHG | HEART RATE: 59 BPM | OXYGEN SATURATION: 97 % | WEIGHT: 124.78 LBS | DIASTOLIC BLOOD PRESSURE: 60 MMHG

## 2022-08-30 DIAGNOSIS — Z98.890 OTHER SPECIFIED POSTPROCEDURAL STATES: Chronic | ICD-10-CM

## 2022-08-30 DIAGNOSIS — Z98.891 HISTORY OF UTERINE SCAR FROM PREVIOUS SURGERY: Chronic | ICD-10-CM

## 2022-08-30 DIAGNOSIS — Z90.89 ACQUIRED ABSENCE OF OTHER ORGANS: Chronic | ICD-10-CM

## 2022-08-30 PROCEDURE — 66984 XCAPSL CTRC RMVL W/O ECP: CPT | Mod: RT

## 2022-08-30 PROCEDURE — 6698F: CPT | Mod: RT

## 2022-08-30 PROCEDURE — V2787: CPT | Mod: RT

## 2022-08-30 DEVICE — IMPLANTABLE DEVICE
Type: IMPLANTABLE DEVICE | Site: RIGHT (RIGHT EYE) | Status: NON-FUNCTIONAL
Removed: 2022-08-30

## 2022-08-30 RX ORDER — PHENYLEPHRINE HCL 2.5 %
1 DROPS OPHTHALMIC (EYE)
Refills: 0 | Status: COMPLETED | OUTPATIENT
Start: 2022-08-30 | End: 2022-08-30

## 2022-08-30 RX ORDER — OXYCODONE HYDROCHLORIDE 5 MG/1
5 TABLET ORAL ONCE
Refills: 0 | Status: DISCONTINUED | OUTPATIENT
Start: 2022-08-30 | End: 2022-08-30

## 2022-08-30 RX ORDER — OFLOXACIN 0.3 %
1 DROPS OPHTHALMIC (EYE)
Refills: 0 | Status: COMPLETED | OUTPATIENT
Start: 2022-08-30 | End: 2022-08-30

## 2022-08-30 RX ORDER — TROPICAMIDE 1 %
1 DROPS OPHTHALMIC (EYE)
Refills: 0 | Status: COMPLETED | OUTPATIENT
Start: 2022-08-30 | End: 2022-08-30

## 2022-08-30 RX ORDER — ACETAMINOPHEN 500 MG
650 TABLET ORAL ONCE
Refills: 0 | Status: DISCONTINUED | OUTPATIENT
Start: 2022-08-30 | End: 2022-08-30

## 2022-08-30 RX ORDER — CYCLOPENTOLATE HYDROCHLORIDE 10 MG/ML
1 SOLUTION/ DROPS OPHTHALMIC
Refills: 0 | Status: COMPLETED | OUTPATIENT
Start: 2022-08-30 | End: 2022-08-30

## 2022-08-30 RX ADMIN — CYCLOPENTOLATE HYDROCHLORIDE 1 DROP(S): 10 SOLUTION/ DROPS OPHTHALMIC at 07:35

## 2022-08-30 RX ADMIN — Medication 1 DROP(S): at 07:40

## 2022-08-30 RX ADMIN — Medication 1 DROP(S): at 07:45

## 2022-08-30 RX ADMIN — CYCLOPENTOLATE HYDROCHLORIDE 1 DROP(S): 10 SOLUTION/ DROPS OPHTHALMIC at 07:40

## 2022-08-30 RX ADMIN — CYCLOPENTOLATE HYDROCHLORIDE 1 DROP(S): 10 SOLUTION/ DROPS OPHTHALMIC at 07:45

## 2022-08-30 RX ADMIN — Medication 1 DROP(S): at 07:35

## 2022-08-31 ENCOUNTER — APPOINTMENT (OUTPATIENT)
Dept: OPHTHALMOLOGY | Facility: CLINIC | Age: 72
End: 2022-08-31

## 2022-08-31 ENCOUNTER — NON-APPOINTMENT (OUTPATIENT)
Age: 72
End: 2022-08-31

## 2022-08-31 PROCEDURE — 99024 POSTOP FOLLOW-UP VISIT: CPT

## 2022-09-02 RX ORDER — SODIUM CHLORIDE 9 MG/ML
1000 INJECTION, SOLUTION INTRAVENOUS
Refills: 0 | Status: DISCONTINUED | OUTPATIENT
Start: 2022-09-06 | End: 2022-09-06

## 2022-09-02 RX ORDER — ACETAMINOPHEN 500 MG
650 TABLET ORAL EVERY 6 HOURS
Refills: 0 | Status: DISCONTINUED | OUTPATIENT
Start: 2022-09-06 | End: 2022-09-06

## 2022-09-02 NOTE — ASU PATIENT PROFILE, ADULT - NSICDXPASTSURGICALHX_GEN_ALL_CORE_FT
no decreased eating/drinking/no mouth sores/no bleeding gums/no nasal congestion/no ear pain/no fever/no headache
PAST SURGICAL HISTORY:  H/O breast biopsy  right left breast    H/O colonoscopy     H/O dilation and curettage 1979    History of tonsillectomy 1958    Right cataract     S/P  section x 1

## 2022-09-02 NOTE — ASU PATIENT PROFILE, ADULT - NS PREOP UNDERSTANDS INFO
no solid food past 12 midnight on Monday night, clear liquids no later than 0700 Tuesday morning/yes

## 2022-09-02 NOTE — ASU PATIENT PROFILE, ADULT - FALL HARM RISK - UNIVERSAL INTERVENTIONS
Bed in lowest position, wheels locked, appropriate side rails in place/Call bell, personal items and telephone in reach/Instruct patient to call for assistance before getting out of bed or chair/Non-slip footwear when patient is out of bed/[place] to call system/Physically safe environment - no spills, clutter or unnecessary equipment/Purposeful Proactive Rounding/Room/bathroom lighting operational, light cord in reach

## 2022-09-02 NOTE — ASU PATIENT PROFILE, ADULT - NS PRO PASSIVE SMOKE EXP
NP REF BY DR R REYES SUDDEN CARDIAC ARREST   Subjective:        [facility]  Cardiology Consult Note    Patient Identification:  Name: Marlyn Rosario  Age: 66 y.o.  Sex: female  :  1954  MRN: [id number]             CC  Syncope  Questionable cardiac arrest          History of Present Illness:   66-year-old female here for the cardiac evaluation as well as establishment of the care, as the patient was found unconscious in the bathroom, CPR was performed EMS was summoned, patient was found to have a wide complex tachycardia for which patient was advised to go to the hospital but refused here for further outpatient work-up denies any chest pains or tightness in the chest    Comorbid cardiac risk factors:     Past Medical History:  Past Medical History:   Diagnosis Date   • Arthritis    • Asthma    • Carotid artery stenosis 2019   • Chronic midline low back pain with left-sided sciatica    • Cystitis    • Diabetic peripheral neuropathy (CMS/HCC)    • Generalized anxiety disorder    • Hyperlipidemia    • Nephrolithiasis    • Urinary tract infection      Past Surgical History:  Past Surgical History:   Procedure Laterality Date   • BREAST BIOPSY     • LAPAROSCOPIC CHOLECYSTECTOMY W/ CHOLANGIOGRAPHY  2012   • LIVER BIOPSY  2012   • SUBTOTAL HYSTERECTOMY     • UPPER GASTROINTESTINAL ENDOSCOPY  2012      Allergies:  Allergies   Allergen Reactions   • Gabapentin    • Lyrica [Pregabalin]    • Wellbutrin [Bupropion]      Home Meds:  (Not in a hospital admission)    Current Meds:   [unfilled]  Social History:   Social History     Tobacco Use   • Smoking status: Former Smoker   • Smokeless tobacco: Never Used   Substance Use Topics   • Alcohol use: No      Family History:  Family History   Adopted: Yes        Review of Systems    Constitutional: No weakness,fatigue, fever, rigors, chills   Eyes: No vision changes, eye pain   ENT/oropharynx: No difficulty swallowing, sore throat,  epistaxis, changes in hearing   Cardiovascular: No chest pain, chest tightness, palpitations, paroxysmal nocturnal dyspnea, orthopnea, diaphoresis, dizziness / syncopal episode   Respiratory: No shortness of breath, dyspnea on exertion, cough, wheezing hemoptysis   Gastrointestinal: No abdominal pain, nausea, vomiting, diarrhea, bloody stools   Genitourinary: No hematuria, dysuria   Neurological:  Syncope   Musculoskeletal: No cramps, myalgias,  joint pain, joint swelling   Integument: No rash, edema           Constitutional:  Heart Rate:  [66] 66  BP: (127)/(72) 127/72    Physical Exam   General:  Appears in no acute distress  Eyes: PERTL,  HEENT:  No JVD. Thyroid not visibly enlarged. No mucosal pallor or cyanosis  Respiratory: Respirations regular and unlabored at rest. BBS with good air entry in all fields. No crackles, rubs or wheezes auscultated  Cardiovascular: S1S2 Regular rate and rhythm. No murmur, rub or gallop auscultated. No carotid bruits. DP/PT pulses    . No pretibial pitting edema  Gastrointestinal: Abdomen soft, flat, non tender. Bowel sounds present. No hepatosplenomegaly. No ascites  Musculoskeletal: RIBERA x4. No abnormal movements  Extremities: No digital clubbing or cyanosis  Skin: Color pink. Skin warm and dry to touch. No rashes  No xanthoma  Neuro: AAO x3 CN II-XII grossly intact            ECG 12 Lead    Date/Time: 11/19/2020 9:36 AM  Performed by: Erica Jones MD  Authorized by: Erica Jones MD   Comparison: compared with previous ECG   Similar to previous ECG  Rhythm: sinus rhythm  ST Flattening: all    Clinical impression: non-specific ECG                Cardiographics  ECG:     Telemetry:    Echocardiogram:     Imaging  Chest X-ray:     Lab Review               @[email or web address]              Assessment:/ Recommendations / Plan:   Patient Active Problem List   Diagnosis   • Left lower quadrant pain   • Right upper quadrant pain   • Acute cystitis   • Acute frontal  sinusitis   • Acute maxillary sinusitis   • Asthmatic bronchitis   • Carpal tunnel syndrome   • Eczema   • Brittle diabetes mellitus (CMS/HCC)   • Dysuria   • Fatigue   • Fibrocystic breast changes   • Generalized anxiety disorder   • Generalized osteoarthritis   • Hyperlipidemia   • Hypoglycemia   • Foot-drop   • Nonalcoholic fatty liver disease   • Otitis media   • Peripheral neuropathy   • Sciatica   • Type 2 diabetes mellitus with diabetic polyneuropathy, with long-term current use of insulin (CMS/HCC)   • Upper respiratory tract infection   • Increased frequency of urination   • Urinary urgency   • Urinary tract infection   • Candidiasis of vagina   • Vertigo   • Cobalamin deficiency   • Vitamin D deficiency   • Adhesive capsulitis of left shoulder   • Left shoulder pain   • Health care maintenance   • Slow transit constipation   • Bursitis/tendonitis, shoulder   • Medicare annual wellness visit, subsequent   • Chronic midline low back pain with left-sided sciatica   • Controlled substance agreement signed   • Visit for screening mammogram   • Hospital discharge follow-up   • Pyelonephritis   • Angular cheilitis   • Recurrent UTI                    ICD-10-CM ICD-9-CM   1. Syncope and collapse  R55 780.2   2. Wide QRS ventricular tachycardia (CMS/HCC)  I47.2 427.1   3. Tobacco use  Z72.0 305.1     1. Syncope and collapse  Patient will need further work-up  - Adult Transthoracic Echo Complete W/ Cont if Necessary Per Protocol  - Case Request Cath Lab: Left Heart Cath  - CBC & Differential  - Basic Metabolic Panel  - aPTT  - Protime-INR    2. Wide QRS ventricular tachycardia (CMS/HCC)  Patient would need diagnostic heart catheter  - Adult Transthoracic Echo Complete W/ Cont if Necessary Per Protocol  - Case Request Cath Lab: Left Heart Cath  - CBC & Differential  - Basic Metabolic Panel  - aPTT  - Protime-INR    3. Tobacco use  Counseling has been done  - Adult Transthoracic Echo Complete W/ Cont if Necessary Per  Protocol  - Case Request Cath Lab: Left Heart Cath  - CBC & Differential  - Basic Metabolic Panel  - aPTT  - Protime-INR       Admit to hosp, echo, cath and loop    Labs/tests ordered for am      Erica Jones MD  11/19/2020, 09:35 EST      EMR Dragon/Transcription:   Dictated utilizing Dragon dictation   No

## 2022-09-02 NOTE — OPERATIVE REPORT - OPERATIVE RPOSRT DETAILS
PATIENT: EVENS VALE	    ACCOUNT  NUMBER:  [id number]     OPERATING SURGEON:  Dr. Monroe Love	    ASSISTANT SURGEON:  [  Karli Flores MD  ]    DATE OF SURGERY:  [  ]	    ANESTHESIA:  MAC/TOPICAL	    ESTIMATED BLOOD LOSS: < 1mL	    COMPLICATIONS:  [   None  ]		    PREOPERATIVE DIAGNOSIS:  Cataract,  [  left    ] eye    POSTOPERATIVE DIAGNOSIS:  Cataract, [ left      ] eye    OPERATIVE PROCEDURE:  1. Femtosecond laser assisted laser surgery  [   left    ] eye.  2. Phacoemulsification with insertion of posterior chamber intraocular lens [     left    ] eye    LENS IMPLANT: [   SN60WF +17.5D  ]    PROCEDURE:	The patient was brought to the laser room where certain aspects of the procedure were performed with the femtosecond laser.     The patient was then brought into the operating room and placed supine on the operating room table. After uneventful induction of neuroleptic anesthesia,  tetracaine was instilled into the operative eye achieving sufficient anesthesia.  The patient was then prepped and draped in the usual sterile fashion.  A wire lid speculum was then inserted into the operative eye giving a wide palpebral fissure.      	A josie knife was used to perform paracenteses through clear cornea at the 12 and 6 o’clock limbal positions.  The anterior chamber was irrigated with lidocaine 1% nonpreserved followed by epinephrine 0.1% nonpreserved.  Viscoelastic was then used to maintain the anterior chamber.  A keratome was used to create a clear corneal incision just inside the temporal limbus.  Capsulorhexis forceps were used to complete the capsulorhexis. Balanced salt solution was used to hydrodissect the nucleus.  The [employer] phacoemulsification unit was then used to completely phacoemulsify the nucleus, following which an aspirating handpiece was used to aspirate all cortical remnants from the capsular bag.  Viscoelastic was  used to reform the anterior chamber and capsular bag.      	A new foldable posterior chamber intraocular lens was brought into the surgical field.  It was folded and directly injected into the capsular bag following which it was centered and secure.  All viscoelastic was then aspirated from the anterior segment using an aspirating handpiece.  All wounds were checked for leaks and there were none.  Vigamox was instilled into the eye. The lid speculum was removed from the eye and a shield placed over the eye.      	The patient tolerated the procedure well and went to the recovery area in good condition.       PATIENT: EVESN VALE	    ACCOUNT  NUMBER:  [id number]     OPERATING SURGEON:  Dr. Monroe Love	    ASSISTANT SURGEON:  [  Karli Flores MD  ]    DATE OF SURGERY:  [ 9/6/22 ]	    ANESTHESIA:  MAC/TOPICAL	    ESTIMATED BLOOD LOSS: < 1mL	    COMPLICATIONS:  [   None  ]		    PREOPERATIVE DIAGNOSIS:  Cataract,  [  left    ] eye    POSTOPERATIVE DIAGNOSIS:  Cataract, [ left      ] eye    OPERATIVE PROCEDURE:  1. Femtosecond laser assisted laser surgery  [   left    ] eye.  2. Phacoemulsification with insertion of posterior chamber intraocular lens [     left    ] eye    LENS IMPLANT: [   SN60WF +17.5D  ]    PROCEDURE:	The patient was brought to the laser room where certain aspects of the procedure were performed with the femtosecond laser.     The patient was then brought into the operating room and placed supine on the operating room table. After uneventful induction of neuroleptic anesthesia,  tetracaine was instilled into the operative eye achieving sufficient anesthesia.  The patient was then prepped and draped in the usual sterile fashion.  A wire lid speculum was then inserted into the operative eye giving a wide palpebral fissure.      	A josie knife was used to perform paracenteses through clear cornea at the 12 and 6 o’clock limbal positions.  The anterior chamber was irrigated with lidocaine 1% nonpreserved followed by epinephrine 0.1% nonpreserved.  Viscoelastic was then used to maintain the anterior chamber.  A keratome was used to create a clear corneal incision just inside the temporal limbus.  Capsulorhexis forceps were used to complete the capsulorhexis. Balanced salt solution was used to hydrodissect the nucleus.  The [employer] phacoemulsification unit was then used to completely phacoemulsify the nucleus, following which an aspirating handpiece was used to aspirate all cortical remnants from the capsular bag.  Viscoelastic was  used to reform the anterior chamber and capsular bag.      	A new foldable posterior chamber intraocular lens was brought into the surgical field.  It was folded and directly injected into the capsular bag following which it was centered and secure.  All viscoelastic was then aspirated from the anterior segment using an aspirating handpiece.  All wounds were checked for leaks and there were none.  Vigamox was instilled into the eye. The lid speculum was removed from the eye and a shield placed over the eye.      	The patient tolerated the procedure well and went to the recovery area in good condition.       PATIENT: EVENS VALE	    ACCOUNT  NUMBER:  [id number]     OPERATING SURGEON:  Dr. Monroe Love	    ASSISTANT SURGEON:  [  Karli Flores MD  ]    DATE OF SURGERY:  [ 9/6/22 ]	    ANESTHESIA:  MAC/TOPICAL	    ESTIMATED BLOOD LOSS: < 1mL	    COMPLICATIONS:  [   None  ]		    PREOPERATIVE DIAGNOSIS:  Cataract,  [  left    ] eye    POSTOPERATIVE DIAGNOSIS:  Cataract, [ left      ] eye    OPERATIVE PROCEDURE:  1. Femtosecond laser assisted laser surgery  [   left    ] eye.  2. Phacoemulsification with insertion of posterior chamber intraocular lens [     left    ] eye    LENS IMPLANT: [   SN60WF +17.5D  ]    PROCEDURE:	The patient was brought to the laser room where certain aspects of the procedure were performed with the femtosecond laser.     The patient was then brought into the operating room and placed supine on the operating room table. After uneventful induction of neuroleptic anesthesia,  tetracaine was instilled into the operative eye achieving sufficient anesthesia.  The patient was then prepped and draped in the usual sterile fashion.  A wire lid speculum was then inserted into the operative eye giving a wide palpebral fissure.      	A josie knife was used to perform paracenteses through clear cornea at the 12 and 6 o’clock limbal positions.  The anterior chamber was irrigated with lidocaine 1% nonpreserved followed by epinephrine 0.1% nonpreserved.  Viscoelastic was then used to maintain the anterior chamber.  A keratome was used to create a clear corneal incision just inside the temporal limbus.  Capsulorhexis forceps were used to complete the capsulorhexis. Balanced salt solution was used to hydrodissect the nucleus.  The [employer] phacoemulsification unit was then used to completely phacoemulsify the nucleus, following which an aspirating handpiece was used to aspirate all cortical remnants from the capsular bag.  Viscoelastic was  used to reform the anterior chamber and capsular bag.      	A new foldable posterior chamber intraocular lens was brought into the surgical field.  It was folded and directly injected into the capsular bag following which it was centered and secure.  All viscoelastic was then aspirated from the anterior segment using an aspirating handpiece.  All wounds were checked for leaks and there were none.  Vigamox was instilled into the eye. The lid speculum was removed from the eye and a shield placed over the eye.        	The patient tolerated the procedure well and went to the recovery area in good condition.       PATIENT: EVENS VALE	    ACCOUNT  NUMBER:  [id number]     OPERATING SURGEON:  Dr. Monroe Love	    ASSISTANT SURGEON:  [  Karli Flores MD  ]    DATE OF SURGERY:  [ 9/6/22 ]	    ANESTHESIA:  MAC/TOPICAL	    ESTIMATED BLOOD LOSS: < 1mL	    COMPLICATIONS:  [   None  ]		    PREOPERATIVE DIAGNOSIS:  Cataract,  [  left    ] eye    POSTOPERATIVE DIAGNOSIS:  Cataract, [ left      ] eye    OPERATIVE PROCEDURE:  1. Femtosecond laser assisted laser surgery  [   left    ] eye.  2. Phacoemulsification with insertion of posterior chamber intraocular lens [     left    ] eye  3. Intraoperative ORA left eye    LENS IMPLANT: [   SN60WF +17.5D  ]    PROCEDURE:	The patient was brought to the laser room where certain aspects of the procedure were performed with the femtosecond laser.     The patient was then brought into the operating room and placed supine on the operating room table. After uneventful induction of neuroleptic anesthesia,  tetracaine was instilled into the operative eye achieving sufficient anesthesia.  The patient was then prepped and draped in the usual sterile fashion.  A wire lid speculum was then inserted into the operative eye giving a wide palpebral fissure.      	A josie knife was used to perform paracenteses through clear cornea at the 12 and 6 o’clock limbal positions.  The anterior chamber was irrigated with lidocaine 1% nonpreserved followed by epinephrine 0.1% nonpreserved.  Viscoelastic was then used to maintain the anterior chamber.  A keratome was used to create a clear corneal incision just inside the temporal limbus.  Capsulorhexis forceps were used to complete the capsulorhexis. Balanced salt solution was used to hydrodissect the nucleus.  The [employer] phacoemulsification unit was then used to completely phacoemulsify the nucleus, following which an aspirating handpiece was used to aspirate all cortical remnants from the capsular bag.  Viscoelastic was  used to reform the anterior chamber and capsular bag.  The [employer] unit was used to help determine the appropriate power lens.    	A new foldable posterior chamber intraocular lens was brought into the surgical field.  It was folded and directly injected into the capsular bag. The ORA unit was used to confirm proper orientation of the lens.   All viscoelastic was then aspirated from the anterior segment using an aspirating handpiece.  All wounds were checked for leaks and there were none.  Vigamox was instilled into the eye. The lid speculum was removed from the eye and a shield placed over the eye.        	The patient tolerated the procedure well and went to the recovery area in good condition.       PATIENT: EVENS VALE	    ACCOUNT  NUMBER:  [id number]     OPERATING SURGEON:  Dr. Monroe Love	    ASSISTANT SURGEON:  [  Karli Flores MD  ]    DATE OF SURGERY:  [ 9/6/22 ]	    ANESTHESIA:  MAC/TOPICAL	    ESTIMATED BLOOD LOSS: < 1mL	    COMPLICATIONS:  [   None  ]		    PREOPERATIVE DIAGNOSIS:  Cataract,  [  left    ] eye    POSTOPERATIVE DIAGNOSIS:  Cataract, [ left      ] eye    OPERATIVE PROCEDURE:  1. Femtosecond laser assisted laser surgery  [   left    ] eye.  2. Phacoemulsification with insertion of posterior chamber intraocular lens [     left    ] eye    LENS IMPLANT: [   SN60WF +17.5D  ]    PROCEDURE:	The patient was brought to the laser room where certain aspects of the procedure were performed with the femtosecond laser.     The patient was then brought into the operating room and placed supine on the operating room table. After uneventful induction of neuroleptic anesthesia,  tetracaine was instilled into the operative eye achieving sufficient anesthesia.  The patient was then prepped and draped in the usual sterile fashion.  A wire lid speculum was then inserted into the operative eye giving a wide palpebral fissure.      	A josie knife was used to perform paracenteses through clear cornea at the 12 and 6 o’clock limbal positions.  The anterior chamber was irrigated with lidocaine 1% nonpreserved followed by epinephrine 0.1% nonpreserved.  Viscoelastic was then used to maintain the anterior chamber.  A keratome was used to create a clear corneal incision just inside the temporal limbus.  Capsulorhexis forceps were used to complete the capsulorhexis. Balanced salt solution was used to hydrodissect the nucleus.  The [employer] phacoemulsification unit was then used to completely phacoemulsify the nucleus, following which an aspirating handpiece was used to aspirate all cortical remnants from the capsular bag.  Viscoelastic was  used to reform the anterior chamber and capsular bag.      	A new foldable posterior chamber intraocular lens was brought into the surgical field.  It was folded and directly injected into the capsular bag following which it was centered and secure.  All viscoelastic was then aspirated from the anterior segment using an aspirating handpiece.  All wounds were checked for leaks and there were none.  Vigamox was instilled into the eye. The lid speculum was removed from the eye and a shield placed over the eye.        	The patient tolerated the procedure well and went to the recovery area in good condition.

## 2022-09-06 ENCOUNTER — TRANSCRIPTION ENCOUNTER (OUTPATIENT)
Age: 72
End: 2022-09-06

## 2022-09-06 ENCOUNTER — OUTPATIENT (OUTPATIENT)
Dept: OUTPATIENT SERVICES | Facility: HOSPITAL | Age: 72
LOS: 1 days | Discharge: HOME | End: 2022-09-06

## 2022-09-06 ENCOUNTER — APPOINTMENT (OUTPATIENT)
Dept: OPHTHALMOLOGY | Facility: AMBULATORY SURGERY CENTER | Age: 72
End: 2022-09-06

## 2022-09-06 ENCOUNTER — NON-APPOINTMENT (OUTPATIENT)
Age: 72
End: 2022-09-06

## 2022-09-06 VITALS
TEMPERATURE: 98 F | DIASTOLIC BLOOD PRESSURE: 75 MMHG | SYSTOLIC BLOOD PRESSURE: 178 MMHG | RESPIRATION RATE: 17 BRPM | HEART RATE: 79 BPM | OXYGEN SATURATION: 96 %

## 2022-09-06 VITALS
OXYGEN SATURATION: 99 % | DIASTOLIC BLOOD PRESSURE: 61 MMHG | WEIGHT: 125.66 LBS | HEIGHT: 59 IN | TEMPERATURE: 98 F | RESPIRATION RATE: 16 BRPM | HEART RATE: 66 BPM | SYSTOLIC BLOOD PRESSURE: 118 MMHG

## 2022-09-06 DIAGNOSIS — Z98.891 HISTORY OF UTERINE SCAR FROM PREVIOUS SURGERY: Chronic | ICD-10-CM

## 2022-09-06 DIAGNOSIS — H26.9 UNSPECIFIED CATARACT: Chronic | ICD-10-CM

## 2022-09-06 DIAGNOSIS — Z90.89 ACQUIRED ABSENCE OF OTHER ORGANS: Chronic | ICD-10-CM

## 2022-09-06 DIAGNOSIS — Z98.890 OTHER SPECIFIED POSTPROCEDURAL STATES: Chronic | ICD-10-CM

## 2022-09-06 PROCEDURE — 6698F: CPT | Mod: LT

## 2022-09-06 PROCEDURE — 66984 XCAPSL CTRC RMVL W/O ECP: CPT | Mod: LT,79

## 2022-09-06 DEVICE — LENS IOL ACRYSOF SN60WF 17.5D
Type: IMPLANTABLE DEVICE | Site: LEFT | Status: NON-FUNCTIONAL
Removed: 2022-09-06

## 2022-09-06 RX ORDER — OFLOXACIN 0.3 %
1 DROPS OPHTHALMIC (EYE)
Refills: 0 | Status: COMPLETED | OUTPATIENT
Start: 2022-09-06 | End: 2022-09-06

## 2022-09-06 RX ORDER — CYCLOPENTOLATE HYDROCHLORIDE 10 MG/ML
1 SOLUTION/ DROPS OPHTHALMIC
Refills: 0 | Status: COMPLETED | OUTPATIENT
Start: 2022-09-06 | End: 2022-09-06

## 2022-09-06 RX ORDER — TROPICAMIDE 1 %
1 DROPS OPHTHALMIC (EYE)
Refills: 0 | Status: COMPLETED | OUTPATIENT
Start: 2022-09-06 | End: 2022-09-06

## 2022-09-06 RX ORDER — ONDANSETRON 8 MG/1
4 TABLET, FILM COATED ORAL ONCE
Refills: 0 | Status: DISCONTINUED | OUTPATIENT
Start: 2022-09-06 | End: 2022-09-06

## 2022-09-06 RX ORDER — ACETAMINOPHEN 500 MG
650 TABLET ORAL ONCE
Refills: 0 | Status: DISCONTINUED | OUTPATIENT
Start: 2022-09-06 | End: 2022-09-06

## 2022-09-06 RX ORDER — ROSUVASTATIN CALCIUM 5 MG/1
1 TABLET ORAL
Qty: 0 | Refills: 0 | DISCHARGE

## 2022-09-06 RX ORDER — LEVOTHYROXINE SODIUM 125 MCG
1 TABLET ORAL
Qty: 0 | Refills: 0 | DISCHARGE

## 2022-09-06 RX ORDER — PHENYLEPHRINE HCL 2.5 %
1 DROPS OPHTHALMIC (EYE)
Refills: 0 | Status: COMPLETED | OUTPATIENT
Start: 2022-09-06 | End: 2022-09-06

## 2022-09-06 RX ADMIN — Medication 1 DROP(S): at 09:08

## 2022-09-06 RX ADMIN — Medication 1 DROP(S): at 09:20

## 2022-09-06 RX ADMIN — CYCLOPENTOLATE HYDROCHLORIDE 1 DROP(S): 10 SOLUTION/ DROPS OPHTHALMIC at 09:18

## 2022-09-06 RX ADMIN — Medication 1 DROP(S): at 09:09

## 2022-09-06 RX ADMIN — Medication 1 DROP(S): at 09:29

## 2022-09-06 RX ADMIN — CYCLOPENTOLATE HYDROCHLORIDE 1 DROP(S): 10 SOLUTION/ DROPS OPHTHALMIC at 09:29

## 2022-09-06 RX ADMIN — Medication 1 DROP(S): at 09:19

## 2022-09-06 RX ADMIN — Medication 1 DROP(S): at 09:21

## 2022-09-06 RX ADMIN — Medication 1 DROP(S): at 09:28

## 2022-09-06 RX ADMIN — CYCLOPENTOLATE HYDROCHLORIDE 1 DROP(S): 10 SOLUTION/ DROPS OPHTHALMIC at 09:08

## 2022-09-06 NOTE — ASU DISCHARGE PLAN (ADULT/PEDIATRIC) - NS MD DC FALL RISK RISK
For information on Fall & Injury Prevention, visit: [email or web address] OR  [email or web address] OR  https://www.cdc.gov/steadi/patient.html

## 2022-09-07 ENCOUNTER — NON-APPOINTMENT (OUTPATIENT)
Age: 72
End: 2022-09-07

## 2022-09-07 ENCOUNTER — APPOINTMENT (OUTPATIENT)
Dept: OPHTHALMOLOGY | Facility: CLINIC | Age: 72
End: 2022-09-07

## 2022-09-07 PROCEDURE — 99024 POSTOP FOLLOW-UP VISIT: CPT

## 2022-09-14 ENCOUNTER — NON-APPOINTMENT (OUTPATIENT)
Age: 72
End: 2022-09-14

## 2022-09-14 ENCOUNTER — APPOINTMENT (OUTPATIENT)
Dept: OPHTHALMOLOGY | Facility: CLINIC | Age: 72
End: 2022-09-14

## 2022-09-14 PROBLEM — E11.9 TYPE 2 DIABETES MELLITUS WITHOUT COMPLICATIONS: Chronic | Status: ACTIVE | Noted: 2022-08-30

## 2022-09-14 PROCEDURE — 99024 POSTOP FOLLOW-UP VISIT: CPT

## 2022-10-05 ENCOUNTER — NON-APPOINTMENT (OUTPATIENT)
Age: 72
End: 2022-10-05

## 2022-10-05 ENCOUNTER — APPOINTMENT (OUTPATIENT)
Dept: OPHTHALMOLOGY | Facility: CLINIC | Age: 72
End: 2022-10-05

## 2022-10-05 PROCEDURE — 99024 POSTOP FOLLOW-UP VISIT: CPT

## 2022-11-02 ENCOUNTER — APPOINTMENT (OUTPATIENT)
Dept: OPHTHALMOLOGY | Facility: CLINIC | Age: 72
End: 2022-11-02

## 2022-11-02 ENCOUNTER — NON-APPOINTMENT (OUTPATIENT)
Age: 72
End: 2022-11-02

## 2022-11-02 PROCEDURE — 99024 POSTOP FOLLOW-UP VISIT: CPT

## 2023-08-29 ENCOUNTER — APPOINTMENT (OUTPATIENT)
Dept: OBGYN | Facility: CLINIC | Age: 73
End: 2023-08-29
Payer: MEDICARE

## 2023-08-29 VITALS
BODY MASS INDEX: 26.03 KG/M2 | DIASTOLIC BLOOD PRESSURE: 71 MMHG | RESPIRATION RATE: 14 BRPM | TEMPERATURE: 98.3 F | WEIGHT: 124 LBS | HEART RATE: 77 BPM | SYSTOLIC BLOOD PRESSURE: 141 MMHG | OXYGEN SATURATION: 98 % | HEIGHT: 58 IN

## 2023-08-29 DIAGNOSIS — D06.9 CARCINOMA IN SITU OF CERVIX, UNSPECIFIED: ICD-10-CM

## 2023-08-29 DIAGNOSIS — Z01.411 ENCOUNTER FOR GYNECOLOGICAL EXAMINATION (GENERAL) (ROUTINE) WITH ABNORMAL FINDINGS: ICD-10-CM

## 2023-08-29 PROCEDURE — 99397 PER PM REEVAL EST PAT 65+ YR: CPT

## 2023-08-30 LAB — HPV HIGH+LOW RISK DNA PNL CVX: NOT DETECTED

## 2023-08-30 NOTE — HISTORY OF PRESENT ILLNESS
[FreeTextEntry1] : pt here for annual gyn exam. pap- 7/2022, neg. 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 neg pap since then.  mammo- 5/2023, neg per pt colonoscopy- scheduled for 5yr f/up next year  sees pcp regularly.

## 2023-09-05 LAB — CYTOLOGY CVX/VAG DOC THIN PREP: NORMAL

## 2023-09-19 NOTE — H&P PST ADULT - BSA (M2)
1.47 Acitretin Counseling:  I discussed with the patient the risks of acitretin including but not limited to hair loss, dry lips/skin/eyes, liver damage, hyperlipidemia, depression/suicidal ideation, photosensitivity.  Serious rare side effects can include but are not limited to pancreatitis, pseudotumor cerebri, bony changes, clot formation/stroke/heart attack.  Patient understands that alcohol is contraindicated since it can result in liver toxicity and significantly prolong the elimination of the drug by many years.

## 2024-09-03 ENCOUNTER — APPOINTMENT (OUTPATIENT)
Dept: OBGYN | Facility: CLINIC | Age: 74
End: 2024-09-03
Payer: MEDICARE

## 2024-09-03 VITALS
HEART RATE: 77 BPM | OXYGEN SATURATION: 98 % | TEMPERATURE: 94.2 F | WEIGHT: 124 LBS | HEIGHT: 58 IN | SYSTOLIC BLOOD PRESSURE: 136 MMHG | RESPIRATION RATE: 14 BRPM | BODY MASS INDEX: 26.03 KG/M2 | DIASTOLIC BLOOD PRESSURE: 74 MMHG

## 2024-09-03 DIAGNOSIS — Z01.419 ENCOUNTER FOR GYNECOLOGICAL EXAMINATION (GENERAL) (ROUTINE) W/OUT ABNORMAL FINDINGS: ICD-10-CM

## 2024-09-03 PROCEDURE — G0101: CPT

## 2024-09-04 LAB — HPV HIGH+LOW RISK DNA PNL CVX: NOT DETECTED

## 2024-09-06 ENCOUNTER — APPOINTMENT (OUTPATIENT)
Dept: COLORECTAL SURGERY | Facility: CLINIC | Age: 74
End: 2024-09-06
Payer: MEDICARE

## 2024-09-06 ENCOUNTER — NON-APPOINTMENT (OUTPATIENT)
Age: 74
End: 2024-09-06

## 2024-09-06 VITALS
BODY MASS INDEX: 26.03 KG/M2 | SYSTOLIC BLOOD PRESSURE: 144 MMHG | TEMPERATURE: 98.4 F | DIASTOLIC BLOOD PRESSURE: 83 MMHG | HEART RATE: 85 BPM | WEIGHT: 124 LBS | HEIGHT: 58 IN

## 2024-09-06 DIAGNOSIS — Z12.11 ENCOUNTER FOR SCREENING FOR MALIGNANT NEOPLASM OF COLON: ICD-10-CM

## 2024-09-06 LAB — CYTOLOGY CVX/VAG DOC THIN PREP: NORMAL

## 2024-09-06 PROCEDURE — 99203 OFFICE O/P NEW LOW 30 MIN: CPT

## 2024-09-06 NOTE — PHYSICAL EXAM
[Chaperone Present] : A chaperone was present in the examining room during all aspects of the physical examination [44557] : A chaperone was present during the pelvic exam. [Appropriately responsive] : appropriately responsive [Alert] : alert [No Acute Distress] : no acute distress [Vulvar Atrophy] : vulvar atrophy [Labia Majora] : normal [Labia Minora] : normal [Atrophy] : atrophy [Normal] : normal

## 2024-09-06 NOTE — PHYSICAL EXAM
[Chaperone Present] : A chaperone was present in the examining room during all aspects of the physical examination [61829] : A chaperone was present during the pelvic exam. [Appropriately responsive] : appropriately responsive [Alert] : alert [No Acute Distress] : no acute distress [Vulvar Atrophy] : vulvar atrophy [Labia Majora] : normal [Labia Minora] : normal [Atrophy] : atrophy [Normal] : normal

## 2024-09-06 NOTE — HISTORY OF PRESENT ILLNESS
[FreeTextEntry1] : EVENS VALE ,75 YO, Presents for Annual. PMHX:Hypothyroidism, PSHX: pap- 2022, neg. 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 neg pap since then. OBHX: [id number] GYNHX :Denies Non Smoker Menopausal  Medication No Sexually active  Last pap was 2023 Last Mammo 2024  No Family History of breast cancer.

## 2024-09-06 NOTE — PLAN
[FreeTextEntry1] : Discussed with patient that after high grade lesion on pap smear recommended pap smears for 25 years. Discussed with patient the importance of shared decision making   Annual exam: pap smear with cotesting, gonorrhea/chlamydia/trichomonas Emphasize to patient the importance of breast self awareness and discussed the signs and symptoms concerning for breast cancer Mammogram referral provided Follow up in 1 year or PRN

## 2024-09-06 NOTE — HISTORY OF PRESENT ILLNESS
[FreeTextEntry1] : EVENS VALE ,75 YO, Presents for Annual. PMHX:Hypothyroidism, PSHX: pap- 2022, neg. 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 neg pap since then. OBHX: [id number]-Mlxgh GYNHX :Denies Non Smoker Menopausal  Medication No Sexually active  Last pap was 2023 Last Mammo 2024  No Family History of breast cancer.

## 2024-11-20 ENCOUNTER — APPOINTMENT (OUTPATIENT)
Dept: COLORECTAL SURGERY | Facility: CLINIC | Age: 74
End: 2024-11-20
Payer: MEDICARE

## 2024-11-20 PROCEDURE — 45378 DIAGNOSTIC COLONOSCOPY: CPT

## 2025-04-25 NOTE — HISTORY OF PRESENT ILLNESS
[FreeTextEntry1] : 75 y/o F presents for initial evaluation and schedule colonoscopy    PMH: Hypothyroidism, Osteoporosis PSH: Tonsillectomy, Oral Surgery, , Left breast lumpectomy, Cataracts  Medications: Synthroid, Actonel, Magnesium, Calcium.  Allergies: Codeine  FMH: Mother w/ colon cancer, diagnosed age 80 s/p partial colectomy  Patient known to Dr. Starr, previously seen at [facility] in 2018 for concern of rectal bleeding, after experienced constipation from taking magnesium/calcium and grapefruit juice.  Prior colonoscopy performed  w/ Dr. Starr, diverticulosis as per patient, otherwise normal.   Patient last seen 10/7/2019, patient scheduled for colonoscopy which she completed 19 with Dr. Starr.   Colonoscopy performed at [facility] with Dr. Starr on 2019, findings revealed: A few small-mouthed diverticula were found in the sigmoid colon and ascending colon. The examination was otherwise normal on direct and retroflexion views. No specimens collected. Recommended to repeat in 5 years   Patient presents today for colonoscopy consultation.  Overall feeling well.  Bowel movements are normal.  
Aroldo Sterling

## 2025-09-08 ENCOUNTER — APPOINTMENT (OUTPATIENT)
Age: 75
End: 2025-09-08
Payer: MEDICARE

## 2025-09-08 ENCOUNTER — NON-APPOINTMENT (OUTPATIENT)
Age: 75
End: 2025-09-08

## 2025-09-08 VITALS
RESPIRATION RATE: 16 BRPM | DIASTOLIC BLOOD PRESSURE: 68 MMHG | HEART RATE: 87 BPM | HEIGHT: 58.39 IN | TEMPERATURE: 98.2 F | SYSTOLIC BLOOD PRESSURE: 113 MMHG | OXYGEN SATURATION: 98 % | BODY MASS INDEX: 24.12 KG/M2 | WEIGHT: 116.5 LBS

## 2025-09-08 DIAGNOSIS — Z01.419 ENCOUNTER FOR GYNECOLOGICAL EXAMINATION (GENERAL) (ROUTINE) W/OUT ABNORMAL FINDINGS: ICD-10-CM

## 2025-09-08 PROCEDURE — G0101: CPT

## 2025-09-09 LAB — HPV HIGH+LOW RISK DNA PNL CVX: NOT DETECTED

## 2025-09-12 LAB — CYTOLOGY CVX/VAG DOC THIN PREP: NORMAL

## (undated) DEVICE — Device

## (undated) DEVICE — NDL HYPO SAFE 25G X 5/8" (ORANGE)

## (undated) DEVICE — PACK ANTERIOR SEGMENT

## (undated) DEVICE — SOL IRR BAL SALT 500ML

## (undated) DEVICE — NDL RETROBULBAR VISITEC 25X1.5

## (undated) DEVICE — DRSG CURITY GAUZE SPONGE 4 X 4" 12-PLY

## (undated) DEVICE — APPLICATOR COTTON TIP 6"

## (undated) DEVICE — CANNULA ANT CHMBR 27GX22MM

## (undated) DEVICE — KNIFE ALCON STANDARD FULL HANDLE 15 DEG (PINK)

## (undated) DEVICE — DRAPE MICROSCOPE KNOB COVER SMALL (2 PCS)

## (undated) DEVICE — SUT NYLON 10-0 12" CU-5

## (undated) DEVICE — GLV 8 PROTEXIS (WHITE)

## (undated) DEVICE — SYR LUER LOK 3CC

## (undated) DEVICE — CATARACT KIT

## (undated) DEVICE — SPECIMEN CONTAINER 4OZ

## (undated) DEVICE — PACK CENTURION 2.4MM

## (undated) DEVICE — CANNULA IRR ANT CHAMBER 30G